# Patient Record
Sex: FEMALE | Race: WHITE | NOT HISPANIC OR LATINO | Employment: OTHER | URBAN - METROPOLITAN AREA
[De-identification: names, ages, dates, MRNs, and addresses within clinical notes are randomized per-mention and may not be internally consistent; named-entity substitution may affect disease eponyms.]

---

## 2017-04-24 ENCOUNTER — OFFICE VISIT (OUTPATIENT)
Dept: OBSTETRICS AND GYNECOLOGY | Facility: CLINIC | Age: 82
End: 2017-04-24
Payer: MEDICARE

## 2017-04-24 VITALS
WEIGHT: 187.81 LBS | BODY MASS INDEX: 32.06 KG/M2 | HEIGHT: 64 IN | SYSTOLIC BLOOD PRESSURE: 134 MMHG | DIASTOLIC BLOOD PRESSURE: 74 MMHG

## 2017-04-24 DIAGNOSIS — N90.89 VULVAR LESION: Primary | ICD-10-CM

## 2017-04-24 DIAGNOSIS — N76.3 SUBACUTE VULVITIS: ICD-10-CM

## 2017-04-24 DIAGNOSIS — N95.2 ATROPHIC VAGINITIS: ICD-10-CM

## 2017-04-24 PROCEDURE — 99999 PR PBB SHADOW E&M-EST. PATIENT-LVL IV: CPT | Mod: PBBFAC,,, | Performed by: OBSTETRICS & GYNECOLOGY

## 2017-04-24 PROCEDURE — 56606 BIOPSY OF VULVA/PERINEUM: CPT | Mod: S$GLB,,, | Performed by: OBSTETRICS & GYNECOLOGY

## 2017-04-24 PROCEDURE — 1159F MED LIST DOCD IN RCRD: CPT | Mod: S$GLB,,, | Performed by: OBSTETRICS & GYNECOLOGY

## 2017-04-24 PROCEDURE — 88305 TISSUE EXAM BY PATHOLOGIST: CPT | Mod: 26,,,

## 2017-04-24 PROCEDURE — 56605 BIOPSY OF VULVA/PERINEUM: CPT | Mod: S$GLB,,, | Performed by: OBSTETRICS & GYNECOLOGY

## 2017-04-24 PROCEDURE — 88305 TISSUE EXAM BY PATHOLOGIST: CPT

## 2017-04-24 PROCEDURE — 99203 OFFICE O/P NEW LOW 30 MIN: CPT | Mod: 25,S$GLB,, | Performed by: OBSTETRICS & GYNECOLOGY

## 2017-04-24 PROCEDURE — 1160F RVW MEDS BY RX/DR IN RCRD: CPT | Mod: S$GLB,,, | Performed by: OBSTETRICS & GYNECOLOGY

## 2017-04-24 PROCEDURE — 1126F AMNT PAIN NOTED NONE PRSNT: CPT | Mod: S$GLB,,, | Performed by: OBSTETRICS & GYNECOLOGY

## 2017-04-24 RX ORDER — DEXTROMETHORPHAN HYDROBROMIDE, GUAIFENESIN 5; 100 MG/5ML; MG/5ML
LIQUID ORAL
COMMUNITY

## 2017-04-24 RX ORDER — CICLOPIROX OLAMINE 7.7 MG/G
CREAM TOPICAL
Refills: 2 | COMMUNITY
Start: 2017-04-04

## 2017-04-24 NOTE — PROGRESS NOTES
"Katheryn Vogt is a 82 y.o.  who presents for   Chief Complaint   Patient presents with    Vulvar Rash     c/o rash and itching in vaginal area x 1 week     No LMP recorded. Patient is postmenopausal.     Pt is not currently sexually active.    No hx of abnormal paps. Last pap was normal on 3/16/09.  Last mammogram was normal on 11.  Last colonoscopy was normal "many yrs ago" per pt.  Pt has never had a Dexa.    Past Medical History:   Diagnosis Date    Anxiety     Arthritis     Arthritis     Hyperlipidemia     Hypertension     Thyroid disease        Past Surgical History:   Procedure Laterality Date    aphakic      CATARACT EXTRACTION      DILATION AND CURETTAGE OF UTERUS      bleeding       Current Outpatient Prescriptions   Medication Sig Dispense Refill    acetaminophen (TYLENOL) 650 MG TbSR Take by mouth.      alprazolam (XANAX) 0.25 MG tablet TAKE 1/2 TO 1 UP TO 3 TIMES DAILY AS NEEDED FOR ANXIETY      amlodipine (NORVASC) 5 MG tablet Take 5 mg by mouth once daily.      ascorbic acid (VITAMIN C) 1000 MG tablet Take 1,000 mg by mouth.      aspirin (ECOTRIN) 81 MG EC tablet Take 81 mg by mouth once daily.      atorvastatin (LIPITOR) 10 MG tablet       calcium carbonate 400 mg Chew Take 250 mg by mouth.      ciclopirox (LOPROX) 0.77 % Crea APPLY A SMALL AMOUNT 2 TIMES A DAY  2    cyanocobalamin (VITAMIN B-12) 1000 MCG tablet Take 100 mcg by mouth.      levothyroxine (SYNTHROID) 25 MCG tablet Take 25 mcg by mouth once daily.      lisinopril (PRINIVIL,ZESTRIL) 40 MG tablet       meloxicam (MOBIC) 7.5 MG tablet       metoprolol succinate (TOPROL-XL) 50 MG 24 hr tablet Take 50 mg by mouth 2 (two) times daily.      multivitamin capsule Take 1 capsule by mouth.      vitamin E 1000 UNIT capsule Take 1,000 Units by mouth.       No current facility-administered medications for this visit.           Review of patient's allergies indicates:   Allergen Reactions    " "Amlodipine-atorvastatin      Pt currently taking amlodpine and okay she was not sure of this allergy    Hydrochlorothiazide        .  Family History   Problem Relation Age of Onset    Diabetes Mother     Hypertension Mother     Cancer Mother      uterine    Glaucoma Father     Diabetes Son     Hypertension Son     Breast cancer Sister     Colon cancer Neg Hx     Ovarian cancer Neg Hx     Thrombosis Neg Hx        Social History   Substance Use Topics    Smoking status: Passive Smoke Exposure - Never Smoker    Smokeless tobacco: None    Alcohol use No       /74  Ht 5' 4" (1.626 m)  Wt 85.2 kg (187 lb 13.3 oz)  BMI 32.24 kg/m2    ROS:  GENERAL: No other acute complaints.   GI: No nausea, vomiting, melena, hematochezia.  : No dysuria, hematuria. C/o vulvar spotting episodically     GEN:  Alert and oriented lady in no acute distress.  SKIN: No hirsutism                 ABDOMEN: Flat, soft and non tender. No mass, hepatomegaly, RUQT or CVAT.   PELVIC:      Vulva: normal genitalia. No suspicious lesions or inguinal adenopathy. Diffuse vulvar erythema but 1 cm annular area on R/midline of perianal area and small area of thickened erythema on R side of vaginal introitus      Urethra: normal size and location. No lesion, prolapse, or discharge. Non tender.      Vagina: Atrophic, no unusual discharge, no significant cystocele or rectocele      Cervix: Normal appearance. Free of discharge or lesion.        Uterus: Normal size, shape, position, non tender. No cervical motion tenderness.           Bladder base is non tender.      Adnexa: No masses, tenderness, or CDS nodularity.      Anus: normal appearing.     WET PREP _ Negative    After obtaining verbal consent ( discussed risks of bleeding, scarring, infection ) the patient was prepped, the perilesional area anesthetized with 1% lidocaine, and a punch biopsy obtained from the R perianal area at 9:00 and the R vaginal introitus at 8:00. The specimen " placed in formalin to be sent to pathology. The biopsy site was made hemostatic with pressure and Monsel's solution. The patient tolerated the procedure well and there were no complications.    IMPRESSION: non specific vulvitis, possible more significant dysplastic lesions      PLAN: gen wound care and mycolog after a couple of days for bx's to heal - RTC 2 wks

## 2017-04-24 NOTE — MR AVS SNAPSHOT
[facility]  [address] [place] [address]  Phone: [phone]  Fax: [phone]                  Katheryn Vogt   2017 9:45 AM   Office Visit    Description:  Female : 1934   Provider:  Nico Kyle MD   Department:  [facility]           Reason for Visit     Vulvar Rash           Diagnoses this Visit        Comments    Vulvar lesion    -  Primary     Subacute vulvitis         Atrophic vaginitis                To Do List           Future Appointments        Provider Department Dept Phone    2017 10:30 AM Nico Kyle MD [facility] -[phone]      Goals (5 Years of Data)     None      [facility]     [facility] On Call Nurse Care Line -  Assistance  Unless otherwise directed by your provider, please contact [facility], our nurse care line that is available for  assistance.     Registered nurses in the [facility] Call Center provide: appointment scheduling, clinical advisement, health education, and other advisory services.  Call: [phone] (toll free)               Medications           Message regarding Medications     Verify the changes and/or additions to your medication regime listed below are the same as discussed with your clinician today.  If any of these changes or additions are incorrect, please notify your healthcare provider.        STOP taking these medications     hydrocodone-acetaminophen 5-325mg (NORCO) 5-325 mg per tablet Take 1 tablet by mouth every 6 (six) hours as needed for Pain.           Verify that the below list of medications is an accurate representation of the medications you are currently taking.  If none reported, the list may be blank. If incorrect, please contact your healthcare provider. Carry this list with you in case of emergency.           Current Medications     acetaminophen (TYLENOL) 650 MG TbSR Take by mouth.    alprazolam (XANAX) 0.25 MG tablet TAKE 1/2 TO 1 UP TO 3 TIMES DAILY AS NEEDED FOR  "ANXIETY    amlodipine (NORVASC) 5 MG tablet Take 5 mg by mouth once daily.    ascorbic acid (VITAMIN C) 1000 MG tablet Take 1,000 mg by mouth.    aspirin (ECOTRIN) 81 MG EC tablet Take 81 mg by mouth once daily.    atorvastatin (LIPITOR) 10 MG tablet     calcium carbonate 400 mg Chew Take 250 mg by mouth.    ciclopirox (LOPROX) 0.77 % Crea APPLY A SMALL AMOUNT 2 TIMES A DAY    cyanocobalamin (VITAMIN B-12) 1000 MCG tablet Take 100 mcg by mouth.    levothyroxine (SYNTHROID) 25 MCG tablet Take 25 mcg by mouth once daily.    lisinopril (PRINIVIL,ZESTRIL) 40 MG tablet     meloxicam (MOBIC) 7.5 MG tablet     metoprolol succinate (TOPROL-XL) 50 MG 24 hr tablet Take 50 mg by mouth 2 (two) times daily.    multivitamin capsule Take 1 capsule by mouth.    vitamin E 1000 UNIT capsule Take 1,000 Units by mouth.           Clinical Reference Information           Your Vitals Were     BP Height Weight BMI       134/74 5' 4" (1.626 m) 85.2 kg (187 lb 13.3 oz) 32.24 kg/m2       Blood Pressure          Most Recent Value    BP  134/74      Allergies as of 4/24/2017     Amlodipine-atorvastatin    Hydrochlorothiazide      Immunizations Administered on Date of Encounter - 4/24/2017     None      Orders Placed During Today's Visit      Normal Orders This Visit    Biopsy     POCT Wet Prep     Tissue Specimen To Pathology, Obstetrics/Gynecology       Language Assistance Services     ATTENTION: Language assistance services are available, free of charge. Please call [phone].      ATENCIÓN: Si habla everañol, tiene a estrella disposición servicios gratuitos de asistencia lingüística. Llame al [phone].     [facility] Ý: N?u b?n nói Ti?ng Vi?t, có các d?ch v? h? tr? ngôn ng? mi?n phí dành cho b?n. G?i s? [phone].         [facility] complies with applicable Federal civil rights laws and does not discriminate on the basis of race, color, national origin, age, disability, or sex.        "

## 2017-04-26 ENCOUNTER — TELEPHONE (OUTPATIENT)
Dept: OBSTETRICS AND GYNECOLOGY | Facility: CLINIC | Age: 82
End: 2017-04-26

## 2017-04-26 RX ORDER — NYSTATIN AND TRIAMCINOLONE ACETONIDE 100000; 1 [USP'U]/G; MG/G
CREAM TOPICAL
Qty: 30 G | Refills: 1 | Status: SHIPPED | OUTPATIENT
Start: 2017-04-26 | End: 2018-04-26

## 2017-04-26 NOTE — TELEPHONE ENCOUNTER
----- Message from Dinora Ogden sent at 4/26/2017  9:19 AM CDT -----  Contact: pt  States she saw Dr Kyle on Monday for a biopsy and he prescribed some type of cream and the pharmacy was it received the prescription yet. Please call pt at [phone]. Thank you

## 2017-05-08 ENCOUNTER — OFFICE VISIT (OUTPATIENT)
Dept: OBSTETRICS AND GYNECOLOGY | Facility: CLINIC | Age: 82
End: 2017-05-08
Payer: MEDICARE

## 2017-05-08 VITALS
DIASTOLIC BLOOD PRESSURE: 70 MMHG | SYSTOLIC BLOOD PRESSURE: 118 MMHG | BODY MASS INDEX: 31.99 KG/M2 | WEIGHT: 187.38 LBS | HEIGHT: 64 IN

## 2017-05-08 DIAGNOSIS — N90.4 VULVAR DYSTROPHY: ICD-10-CM

## 2017-05-08 PROBLEM — N90.89 VULVAR LESION: Status: RESOLVED | Noted: 2017-04-24 | Resolved: 2017-05-08

## 2017-05-08 PROCEDURE — 1126F AMNT PAIN NOTED NONE PRSNT: CPT | Mod: S$GLB,,, | Performed by: OBSTETRICS & GYNECOLOGY

## 2017-05-08 PROCEDURE — 99999 PR PBB SHADOW E&M-EST. PATIENT-LVL III: CPT | Mod: PBBFAC,,, | Performed by: OBSTETRICS & GYNECOLOGY

## 2017-05-08 PROCEDURE — 99212 OFFICE O/P EST SF 10 MIN: CPT | Mod: S$GLB,,, | Performed by: OBSTETRICS & GYNECOLOGY

## 2017-05-08 PROCEDURE — 1159F MED LIST DOCD IN RCRD: CPT | Mod: S$GLB,,, | Performed by: OBSTETRICS & GYNECOLOGY

## 2017-05-08 PROCEDURE — 1160F RVW MEDS BY RX/DR IN RCRD: CPT | Mod: S$GLB,,, | Performed by: OBSTETRICS & GYNECOLOGY

## 2017-05-08 RX ORDER — CLOBETASOL PROPIONATE 0.5 MG/G
CREAM TOPICAL DAILY
Qty: 30 G | Refills: 2 | Status: SHIPPED | OUTPATIENT
Start: 2017-05-08 | End: 2017-10-13 | Stop reason: SDUPTHER

## 2017-05-08 RX ORDER — MELOXICAM 7.5 MG/1
7.5 TABLET ORAL
COMMUNITY
Start: 2017-04-25 | End: 2017-05-08

## 2017-05-08 RX ORDER — AMLODIPINE BESYLATE 5 MG/1
5 TABLET ORAL
COMMUNITY
Start: 2017-05-02 | End: 2017-05-08

## 2017-05-08 RX ORDER — LISINOPRIL 40 MG/1
40 TABLET ORAL
COMMUNITY
Start: 2017-04-25 | End: 2017-05-08

## 2017-05-08 NOTE — PROGRESS NOTES
Katheryn presents in f/u of vulvar and perianal bx's done on 4/24/17.  - doing well and no c/o    FINAL PATHOLOGIC DIAGNOSIS  1 RIGHT INTROITUS, 4:00:  BENIGN SQUAMOUS MUCOSA WITH HYPERKERATOSIS.  FUNGAL HYPHAE IDENTIFIED.  NO DYSPLASIA IDENTIFIED.  2 PERIANAL, 9:00, BIOPSY:  BENIGN SQUAMOUS MUCOSA WITH HYPERKERATOSIS AND SUBEPITHELIAL HYALINIZATION,  SUGGESTIVE OF LICHEN SCLEROSIS ET ATROPHICUS.  FUNGAL HYPHAE IDENTIFIED.  NO DYSPLASIA IDENTIFIED.    The introital lesion is better    Discussed LSetA w pt and her daughter and how to use clobetasol and barring any interval problem RTC in 2 mo

## 2017-05-18 ENCOUNTER — TELEPHONE (OUTPATIENT)
Dept: OBSTETRICS AND GYNECOLOGY | Facility: CLINIC | Age: 82
End: 2017-05-18

## 2017-05-18 NOTE — TELEPHONE ENCOUNTER
Pt informed name of cream she was given (mycolog and clobetasol).  Pt voiced understanding.  VB, LPN

## 2017-05-18 NOTE — TELEPHONE ENCOUNTER
----- Message from Sylvain Landis sent at 5/18/2017  8:17 AM CDT -----  Contact: same  Patient called in and is requesting a call back from nurse regarding cream that is supposed to be applied to her vaginal area.  Patient needs to know name.    Patient call back number is [phone]

## 2017-06-19 ENCOUNTER — TELEPHONE (OUTPATIENT)
Dept: OBSTETRICS AND GYNECOLOGY | Facility: CLINIC | Age: 82
End: 2017-06-19

## 2017-06-19 NOTE — TELEPHONE ENCOUNTER
Appt made for 6/28/17 at 11am at [facility] [place] location, per pt request.  Pt voiced understanding.  BARRINGTON MANDEL

## 2017-06-28 ENCOUNTER — OFFICE VISIT (OUTPATIENT)
Dept: OBSTETRICS AND GYNECOLOGY | Facility: CLINIC | Age: 82
End: 2017-06-28
Payer: MEDICARE

## 2017-06-28 VITALS
WEIGHT: 188.25 LBS | HEIGHT: 64 IN | DIASTOLIC BLOOD PRESSURE: 100 MMHG | BODY MASS INDEX: 32.14 KG/M2 | SYSTOLIC BLOOD PRESSURE: 180 MMHG

## 2017-06-28 DIAGNOSIS — N76.3 CHRONIC VULVITIS: Primary | ICD-10-CM

## 2017-06-28 PROCEDURE — 99212 OFFICE O/P EST SF 10 MIN: CPT | Mod: S$GLB,,, | Performed by: OBSTETRICS & GYNECOLOGY

## 2017-06-28 PROCEDURE — 1159F MED LIST DOCD IN RCRD: CPT | Mod: S$GLB,,, | Performed by: OBSTETRICS & GYNECOLOGY

## 2017-06-28 PROCEDURE — 99999 PR PBB SHADOW E&M-EST. PATIENT-LVL III: CPT | Mod: PBBFAC,,, | Performed by: OBSTETRICS & GYNECOLOGY

## 2017-06-28 NOTE — PROGRESS NOTES
Katheryn Vogt is a 83 y.o.  who presents for   Chief Complaint   Patient presents with    Follow-up     vulva and perianal biopsy site check   - no c/o but ?'s about the condition and tx    - FINAL PATHOLOGIC DIAGNOSIS  1 RIGHT INTROITUS, 4:00:  BENIGN SQUAMOUS MUCOSA WITH HYPERKERATOSIS.  FUNGAL HYPHAE IDENTIFIED.  NO DYSPLASIA IDENTIFIED.  2 PERIANAL, 9:00, BIOPSY:  BENIGN SQUAMOUS MUCOSA WITH HYPERKERATOSIS AND SUBEPITHELIAL HYALINIZATION,  SUGGESTIVE OF LICHEN SCLEROSIS ET ATROPHICUS.  FUNGAL HYPHAE IDENTIFIED.  NO DYSPLASIA IDENTIFIED.      Pt is not currently sexually active.         Past Medical History:   Diagnosis Date    Anxiety     Arthritis     Arthritis     Hyperlipidemia     Hypertension     Thyroid disease        Past Surgical History:   Procedure Laterality Date    aphakic      CATARACT EXTRACTION      DILATION AND CURETTAGE OF UTERUS      bleeding       Current Outpatient Prescriptions   Medication Sig Dispense Refill    acetaminophen (TYLENOL) 650 MG TbSR Take by mouth.      alprazolam (XANAX) 0.25 MG tablet TAKE 1/2 TO 1 UP TO 3 TIMES DAILY AS NEEDED FOR ANXIETY      amlodipine (NORVASC) 5 MG tablet Take 5 mg by mouth once daily.      ascorbic acid (VITAMIN C) 1000 MG tablet Take 1,000 mg by mouth.      aspirin (ECOTRIN) 81 MG EC tablet Take 81 mg by mouth once daily.      atorvastatin (LIPITOR) 10 MG tablet       calcium carbonate 400 mg Chew Take 250 mg by mouth.      cholecalciferal (CHOLECALCIFERAL) 100,000 IU/mL injecttion       ciclopirox (LOPROX) 0.77 % Crea APPLY A SMALL AMOUNT 2 TIMES A DAY  2    clobetasol (TEMOVATE) 0.05 % cream Apply topically once daily. Apply to affected area daily (at bedtime) for 6 weeks, then apply once or twice weekly 30 g 2    cyanocobalamin (VITAMIN B-12) 1000 MCG tablet Take 100 mcg by mouth.      levothyroxine (SYNTHROID) 25 MCG tablet Take 25 mcg by mouth once daily.      lisinopril (PRINIVIL,ZESTRIL) 40 MG tablet        "meloxicam (MOBIC) 7.5 MG tablet       metoprolol succinate (TOPROL-XL) 50 MG 24 hr tablet Take 50 mg by mouth 2 (two) times daily.      multivitamin capsule Take 1 capsule by mouth.      nystatin-triamcinolone (MYCOLOG II) cream Apply to affected area 2 times daily 30 g 1    vitamin E 1000 UNIT capsule Take 1,000 Units by mouth.       No current facility-administered medications for this visit.           Review of patient's allergies indicates:   Allergen Reactions    Amlodipine-atorvastatin      Pt currently taking amlodpine and okay she was not sure of this allergy    Hydrochlorothiazide        BP (!) 180/100   Ht 5' 4" (1.626 m)   Wt 85.4 kg (188 lb 4.4 oz)   BMI 32.32 kg/m²      - repeat /80    ROS:  GENERAL: No acute complaints.       GEN:  Alert and oriented lady in no acute distress.                 ABDOMEN: Overweight, soft and non tender.   PELVIC:      Vulva: normal genitalia. Both bx sites healing well        IMPRESSION: bx sites healing well, little change in skin so far        PLAN: rediscussed LSetA - chronic cond generally well controlled/managed w clobetasol  - rec only light coat once weekly as less symptomatic as it is a strong steroid and overuse can cause other problems            "

## 2017-10-13 ENCOUNTER — OFFICE VISIT (OUTPATIENT)
Dept: OBSTETRICS AND GYNECOLOGY | Facility: CLINIC | Age: 82
End: 2017-10-13
Payer: MEDICARE

## 2017-10-13 VITALS
SYSTOLIC BLOOD PRESSURE: 151 MMHG | BODY MASS INDEX: 32.77 KG/M2 | DIASTOLIC BLOOD PRESSURE: 92 MMHG | WEIGHT: 190.94 LBS

## 2017-10-13 DIAGNOSIS — N76.3 CHRONIC VULVITIS: Primary | ICD-10-CM

## 2017-10-13 DIAGNOSIS — N90.4 VULVAR DYSTROPHY: ICD-10-CM

## 2017-10-13 PROCEDURE — 99213 OFFICE O/P EST LOW 20 MIN: CPT | Mod: S$GLB,,, | Performed by: NURSE PRACTITIONER

## 2017-10-13 PROCEDURE — 99999 PR PBB SHADOW E&M-EST. PATIENT-LVL III: CPT | Mod: PBBFAC,,, | Performed by: NURSE PRACTITIONER

## 2017-10-13 RX ORDER — CLOBETASOL PROPIONATE 0.5 MG/G
CREAM TOPICAL DAILY
Qty: 30 G | Refills: 4 | Status: SHIPPED | OUTPATIENT
Start: 2017-10-13

## 2017-10-13 RX ORDER — FLUCONAZOLE 150 MG/1
TABLET ORAL
Qty: 2 TABLET | Refills: 1 | Status: SHIPPED | OUTPATIENT
Start: 2017-10-13

## 2017-10-13 NOTE — PROGRESS NOTES
Katheryn Vogt is a 83 y.o. female  presents with complaint of vaginal itcing - same complaint as has had in past.  Did not realized had to use ointment at least once a week to keep her lichen sclerosis under control.     Past Medical History:   Diagnosis Date    Anxiety     Arthritis     Arthritis     Hyperlipidemia     Hypertension     Thyroid disease      Past Surgical History:   Procedure Laterality Date    aphakic      CATARACT EXTRACTION      DILATION AND CURETTAGE OF UTERUS      bleeding     Social History   Substance Use Topics    Smoking status: Passive Smoke Exposure - Never Smoker    Smokeless tobacco: Never Used    Alcohol use No     Family History   Problem Relation Age of Onset    Diabetes Mother     Hypertension Mother     Cancer Mother      uterine    Glaucoma Father     Diabetes Son     Hypertension Son     Breast cancer Sister     Colon cancer Neg Hx     Ovarian cancer Neg Hx     Thrombosis Neg Hx      OB History    Para Term  AB Living   2 2 2     2   SAB TAB Ectopic Multiple Live Births                  # Outcome Date GA Lbr Radhames/2nd Weight Sex Delivery Anes PTL Lv   2 Term            1 Term                   BP (!) 151/92   Wt 86.6 kg (190 lb 14.7 oz)   BMI 32.77 kg/m²     ROS:  Per hpi    PHYSICAL EXAM:  VULVA: vulvar erythema bilaterally     ASSESSMENT and PLAN:  1. Chronic vulvitis  fluconazole (DIFLUCAN) 150 MG Tab    clobetasol (TEMOVATE) 0.05 % cream   2. Vulvar dystrophy  clobetasol (TEMOVATE) 0.05 % cream     Will treat for fungas since biopsy had shown this in past and have her restart the clobetasol    Patient was counseled today on vaginitis prevention including :  a. avoiding feminine products such as deoderant soaps, body wash, bubble bath, douches, scented toilet paper, deoderant tampons or pads, feminine wipes, chronic pad use, etc.  b. avoiding other vulvovaginal irritants such as long hot baths, humidity, tight, synthetic  clothing, chlorine and sitting around in wet bathing suits  c. wearing cotton underwear, avoiding thong underwear and no underwear to bed  d. taking showers instead of baths and use a hair dryer on cool setting afterwards to dry  e. wearing cotton to exercise and shower immediately after exercise and change clothes  f. using polyurethane condoms without spermicide if sexually active and symptoms are triggered by intercourse

## 2017-10-13 NOTE — PATIENT INSTRUCTIONS
Understanding Lichen Sclerosus  Lichen sclerosus is a long-term (chronic) skin condition. It causes white patches to form on the body. These can appear anywhere, even in the mouth. But they most often affect skin around the genitals. The condition is more common in older women and young girls. It also tends to occur in men who are not circumcised.  How to say it  [id number]   What causes lichen sclerosus?  Experts dont yet know exactly what causes lichen sclerosus. It may be an autoimmune disease. Thats when the immune system attacks healthy parts of the body, such as the skin. It may also be linked to genetics, hormones, or some infections.  Symptoms of lichen sclerosus  Lichen sclerosus causes white patches on the skin. These patches break down the skin. The skin may become thin, wrinkled, and cracked. It can be itchy and painful. The patches may scar, discolor, and disfigure the skin. These changes can damage the skin. They are often found on the back, shoulders, neck, wrist, thigh, and breast areas. They may also be found on the lips or in the mouth. They can also appear around the vagina and on the penis. That may lead to problems having sex and using the bathroom.  Treatment for lichen sclerosus  Treatment can ease symptoms and prevent scarring. It should be started early to avoid lasting (permanent) damage to the skin. Treatment options include:  · Skin care. Bathing with mild soaps and using moisturizing cream may ease itching.  · Steroids. These medicines are often put on the skin as an ointment or cream. Very strong steroid creams are used. Your provider may also inject these into the white patches in severe cases.  · Other medicines. An oral medicine (antihistamine) may be given to ease itching. Other creams or ointments are also available if a steroid doesnt work.  · Phototherapy. This treatment directs ultraviolet light on the skin to help clear it.  · Surgery. This treatment helps with  scarring and skin disfigurement. Men may benefit from circumcision if they havent yet had it done.  Possible complications of lichen sclerosus  ·  Skin cancer of the genitals  When to call your healthcare provider  Call your healthcare provider right away if you have any of these:  · Fever of 100.4°F (38°C) or higher, or as directed  · Redness, swelling, or fluid leaking from your incision that gets worse  · Pain that gets worse  · Symptoms that dont get better, or get worse  · New symptoms   Date Last Reviewed: 5/1/2016  © 9324-1250 [employer] [address] [place] [id number]. All rights reserved. This information is not intended as a substitute for professional medical care. Always follow your healthcare professional's instructions.

## 2018-01-17 ENCOUNTER — TELEPHONE (OUTPATIENT)
Dept: OPHTHALMOLOGY | Facility: CLINIC | Age: 83
End: 2018-01-17

## 2018-01-17 NOTE — TELEPHONE ENCOUNTER
----- Message from Rex Carey sent at 1/17/2018  9:12 AM CST -----  Contact: SELF  Pt called in about wanting to cancel appt      Please cancel appt      TY

## 2019-10-07 NOTE — TELEPHONE ENCOUNTER
----- Message from Dinora Ogden sent at 6/19/2017  8:30 AM CDT -----  Contact: pt  States she having a problem in her vaginal area and she would like a sooner appt. Nothing available with Dr Kyle until July. Please call pt at [phone]. Thank you    Dr. Garcia's note stated patient to have stress test in the am. Paged Dr. Garcia to ask for stress test orders for patient tomorrow. MD stated she will put them in.